# Patient Record
Sex: FEMALE | Race: WHITE | NOT HISPANIC OR LATINO | URBAN - METROPOLITAN AREA
[De-identification: names, ages, dates, MRNs, and addresses within clinical notes are randomized per-mention and may not be internally consistent; named-entity substitution may affect disease eponyms.]

---

## 2017-10-10 ENCOUNTER — OUTPATIENT (OUTPATIENT)
Dept: OUTPATIENT SERVICES | Facility: HOSPITAL | Age: 57
LOS: 1 days | End: 2017-10-10
Payer: COMMERCIAL

## 2017-10-10 PROCEDURE — C1739: CPT

## 2017-10-10 PROCEDURE — 19281 PERQ DEVICE BREAST 1ST IMAG: CPT | Mod: RT

## 2017-10-10 PROCEDURE — 19281 PERQ DEVICE BREAST 1ST IMAG: CPT

## 2017-10-12 ENCOUNTER — OUTPATIENT (OUTPATIENT)
Dept: OUTPATIENT SERVICES | Facility: HOSPITAL | Age: 57
LOS: 1 days | End: 2017-10-12
Payer: COMMERCIAL

## 2017-10-12 DIAGNOSIS — D48.61 NEOPLASM OF UNCERTAIN BEHAVIOR OF RIGHT BREAST: ICD-10-CM

## 2017-10-12 DIAGNOSIS — Z85.3 PERSONAL HISTORY OF MALIGNANT NEOPLASM OF BREAST: ICD-10-CM

## 2017-10-13 LAB — SURGICAL PATHOLOGY STUDY: SIGNIFICANT CHANGE UP

## 2017-10-16 NOTE — ASU PATIENT PROFILE, ADULT - PMH
Breast cancer Alzheimer disease  early onset  Breast cancer    Hypothyroidism Alzheimer disease  early onset  Breast cancer    Diverticulitis    Hypothyroidism

## 2017-10-16 NOTE — ASU PATIENT PROFILE, ADULT - PSH
History of surgery  left ear  History of surgery on extremity  left breast H/O lumpectomy  left  History of surgery  left ear  History of surgery on extremity  left breast

## 2017-10-16 NOTE — ASU PATIENT PROFILE, ADULT - VISION (WITH CORRECTIVE LENSES IF THE PATIENT USUALLY WEARS THEM):
Partially impaired: cannot see medication labels or newsprint, but can see obstacles in path, and the surrounding layout; can count fingers at arm's length/wears contacts

## 2017-10-16 NOTE — ASU PATIENT PROFILE, ADULT - TEACHING/LEARNING LEARNING PREFERENCES
individual instruction/audio/written material/verbal instruction skill demonstration/written material/individual instruction/verbal instruction

## 2017-10-17 ENCOUNTER — OUTPATIENT (OUTPATIENT)
Dept: INPATIENT UNIT | Facility: HOSPITAL | Age: 57
LOS: 1 days | Discharge: HOME | End: 2017-10-17
Payer: COMMERCIAL

## 2017-10-17 VITALS
HEIGHT: 65 IN | SYSTOLIC BLOOD PRESSURE: 119 MMHG | TEMPERATURE: 97 F | OXYGEN SATURATION: 98 % | RESPIRATION RATE: 16 BRPM | WEIGHT: 162.7 LBS | DIASTOLIC BLOOD PRESSURE: 56 MMHG | HEART RATE: 56 BPM

## 2017-10-17 VITALS — DIASTOLIC BLOOD PRESSURE: 55 MMHG | OXYGEN SATURATION: 95 % | HEART RATE: 58 BPM | SYSTOLIC BLOOD PRESSURE: 91 MMHG

## 2017-10-17 DIAGNOSIS — Z98.890 OTHER SPECIFIED POSTPROCEDURAL STATES: Chronic | ICD-10-CM

## 2017-10-17 PROCEDURE — 19125 EXCISION BREAST LESION: CPT | Mod: RT

## 2017-10-17 PROCEDURE — 88321 CONSLTJ&REPRT SLD PREP ELSWR: CPT

## 2017-10-17 PROCEDURE — 88305 TISSUE EXAM BY PATHOLOGIST: CPT

## 2017-10-17 PROCEDURE — 76098 X-RAY EXAM SURGICAL SPECIMEN: CPT | Mod: 26

## 2017-10-17 PROCEDURE — 76098 X-RAY EXAM SURGICAL SPECIMEN: CPT

## 2017-10-17 PROCEDURE — 88307 TISSUE EXAM BY PATHOLOGIST: CPT

## 2017-10-17 RX ORDER — ONDANSETRON 8 MG/1
4 TABLET, FILM COATED ORAL
Qty: 0 | Refills: 0 | Status: DISCONTINUED | OUTPATIENT
Start: 2017-10-17 | End: 2017-10-17

## 2017-10-17 NOTE — BRIEF OPERATIVE NOTE - PROCEDURE
<<-----Click on this checkbox to enter Procedure Excisional biopsy of breast with needle localization  10/17/2017  with alma- localization  Active  B[place]LY

## 2017-10-17 NOTE — BRIEF OPERATIVE NOTE - OPERATION/FINDINGS
local administered (mixed lido 1% and marcaine 0.5% with epi). lateral inferior perioareolar incision was made. confirmed with Aneta- probe the location of the lesion. excised, confirmed both aneta clips and biopsy clip on tissue mammogram. two sites of brown duct lesions noticed in the superior margin and mediosuperior margin (resembling ductal aclasia) both excised and sent with the original specimen to pathology. hemostasis achieved. flaps raised for better cosmetic closure.

## 2017-10-19 LAB
SURGICAL PATHOLOGY STUDY: SIGNIFICANT CHANGE UP
SURGICAL PATHOLOGY STUDY: SIGNIFICANT CHANGE UP

## 2017-10-23 DIAGNOSIS — N60.91 UNSPECIFIED BENIGN MAMMARY DYSPLASIA OF RIGHT BREAST: ICD-10-CM

## 2017-10-23 DIAGNOSIS — N60.41 MAMMARY DUCT ECTASIA OF RIGHT BREAST: ICD-10-CM

## 2017-10-23 DIAGNOSIS — E03.9 HYPOTHYROIDISM, UNSPECIFIED: ICD-10-CM

## 2020-08-17 NOTE — BRIEF OPERATIVE NOTE - POST-OP DX
Assessment/Plan:       Diagnoses and all orders for this visit:    Allergic dermatitis  -     methylPREDNISolone 4 MG tablet therapy pack; Use as directed on package    Allergic reaction, initial encounter  -     methylPREDNISolone 4 MG tablet therapy pack; Use as directed on package      Suspect some form of allergic reaction related to possible bug bite - unsure of cause but will treat with prednisone to stop spread and itching  Subjective:      Patient ID: Zelalem Dutta is a 74 y o  female  Here today for an acute visit  Reports onset of itching and a rash on her face surrounding her right eye on Friday which has spread to her arms and legs  Reports it is itching       The following portions of the patient's history were reviewed and updated as appropriate: allergies, current medications, past family history, past medical history, past social history, past surgical history and problem list     Review of Systems   Skin: Positive for rash  Please see HPI  All other systems reviewed and are negative  Objective:      /76 (BP Location: Right arm, Patient Position: Sitting, Cuff Size: Large)   Pulse 95   Temp (!) 97 3 °F (36 3 °C)   Ht 5' 4" (1 626 m)   Wt 83 8 kg (184 lb 12 8 oz)   SpO2 98%   BMI 31 72 kg/m²          Physical Exam  Skin:     Findings: Rash present  Rash is papular  Comments: Scattered papular on arms, legs  Macular rash surrounding right periorbital area 
Atypical hyperplasia of right breast  10/17/2017    Active  Suha Farrar
